# Patient Record
Sex: FEMALE | Race: WHITE | Employment: FULL TIME | URBAN - METROPOLITAN AREA
[De-identification: names, ages, dates, MRNs, and addresses within clinical notes are randomized per-mention and may not be internally consistent; named-entity substitution may affect disease eponyms.]

---

## 2017-09-28 ENCOUNTER — HOSPITAL ENCOUNTER (OUTPATIENT)
Dept: WOMENS IMAGING | Age: 47
Discharge: HOME | End: 2017-09-28
Attending: OBSTETRICS & GYNECOLOGY | Admitting: NURSE PRACTITIONER

## 2017-09-28 DIAGNOSIS — Z12.31 SCREENING MAMMOGRAM, ENCOUNTER FOR: ICD-10-CM

## 2020-11-25 ENCOUNTER — HOSPITAL ENCOUNTER (OUTPATIENT)
Dept: MAMMOGRAPHY | Age: 50
Discharge: HOME | End: 2020-11-25
Payer: COMMERCIAL

## 2020-11-25 PROCEDURE — 77067 SCR MAMMO BI INCL CAD: CPT

## 2024-04-03 ENCOUNTER — HOSPITAL ENCOUNTER (OUTPATIENT)
Dept: MAMMOGRAPHY | Age: 54
Discharge: HOME | End: 2024-04-03
Payer: COMMERCIAL

## 2024-04-03 VITALS — BODY MASS INDEX: 23.92 KG/M2 | HEIGHT: 62 IN | WEIGHT: 130 LBS

## 2024-04-03 DIAGNOSIS — Z12.31 SCREENING MAMMOGRAM FOR BREAST CANCER: ICD-10-CM

## 2024-04-03 PROCEDURE — 77063 BREAST TOMOSYNTHESIS BI: CPT

## 2025-02-04 ENCOUNTER — APPOINTMENT (OUTPATIENT)
Dept: GENERAL RADIOLOGY | Age: 55
End: 2025-02-04
Payer: COMMERCIAL

## 2025-02-04 ENCOUNTER — HOSPITAL ENCOUNTER (EMERGENCY)
Age: 55
Discharge: HOME | End: 2025-02-04
Attending: EMERGENCY MEDICINE
Payer: COMMERCIAL

## 2025-02-04 VITALS
HEART RATE: 54 BPM | RESPIRATION RATE: 16 BRPM | BODY MASS INDEX: 25.21 KG/M2 | DIASTOLIC BLOOD PRESSURE: 69 MMHG | TEMPERATURE: 97.6 F | WEIGHT: 137 LBS | SYSTOLIC BLOOD PRESSURE: 102 MMHG | HEIGHT: 62 IN | OXYGEN SATURATION: 97 %

## 2025-02-04 DIAGNOSIS — R07.89 CHEST WALL PAIN: Primary | ICD-10-CM

## 2025-02-04 LAB
ALBUMIN SERPL-MCNC: 4.2 G/DL (ref 3.4–5)
ALBUMIN/GLOB SERPL: 1.7 {RATIO} (ref 1.1–2.2)
ALP SERPL-CCNC: 62 U/L (ref 40–129)
ALT SERPL-CCNC: 16 U/L (ref 10–40)
ANION GAP SERPL CALCULATED.3IONS-SCNC: 10 MMOL/L (ref 4–16)
AST SERPL-CCNC: 26 U/L (ref 15–37)
BASOPHILS # BLD: 0.06 K/UL
BASOPHILS NFR BLD: 1 % (ref 0–1)
BILIRUB SERPL-MCNC: 0.3 MG/DL (ref 0–1)
BUN SERPL-MCNC: 20 MG/DL (ref 6–23)
CALCIUM SERPL-MCNC: 9.2 MG/DL (ref 8.3–10.6)
CHLORIDE SERPL-SCNC: 101 MMOL/L (ref 99–110)
CO2 SERPL-SCNC: 29 MMOL/L (ref 21–32)
CREAT SERPL-MCNC: 0.6 MG/DL (ref 0.6–1.1)
EOSINOPHIL # BLD: 0.11 K/UL
EOSINOPHILS RELATIVE PERCENT: 2 % (ref 0–3)
ERYTHROCYTE [DISTWIDTH] IN BLOOD BY AUTOMATED COUNT: 11 % (ref 11.7–14.9)
GFR, ESTIMATED: >90 ML/MIN/1.73M2
GLUCOSE SERPL-MCNC: 90 MG/DL (ref 74–99)
HCT VFR BLD AUTO: 42.7 % (ref 37–47)
HGB BLD-MCNC: 14.3 G/DL (ref 12.5–16)
IMM GRANULOCYTES # BLD AUTO: 0.01 K/UL
IMM GRANULOCYTES NFR BLD: 0 %
LYMPHOCYTES NFR BLD: 2.55 K/UL
LYMPHOCYTES RELATIVE PERCENT: 43 % (ref 24–44)
MCH RBC QN AUTO: 29.1 PG (ref 27–31)
MCHC RBC AUTO-ENTMCNC: 33.5 G/DL (ref 32–36)
MCV RBC AUTO: 87 FL (ref 78–100)
MONOCYTES NFR BLD: 0.74 K/UL
MONOCYTES NFR BLD: 13 % (ref 0–4)
NEUTROPHILS NFR BLD: 41 % (ref 36–66)
NEUTS SEG NFR BLD: 2.44 K/UL
PLATELET # BLD AUTO: 271 K/UL (ref 140–440)
PMV BLD AUTO: 8.4 FL (ref 7.5–11.1)
POTASSIUM SERPL-SCNC: 3.8 MMOL/L (ref 3.5–5.1)
PROT SERPL-MCNC: 6.7 G/DL (ref 6.4–8.2)
RBC # BLD AUTO: 4.91 M/UL (ref 4.2–5.4)
SODIUM SERPL-SCNC: 140 MMOL/L (ref 135–145)
TROPONIN I SERPL HS-MCNC: 6 NG/L (ref 0–13)
TROPONIN I SERPL HS-MCNC: 6 NG/L (ref 0–13)
WBC OTHER # BLD: 5.9 K/UL (ref 4–10.5)

## 2025-02-04 PROCEDURE — 80053 COMPREHEN METABOLIC PANEL: CPT

## 2025-02-04 PROCEDURE — 71045 X-RAY EXAM CHEST 1 VIEW: CPT

## 2025-02-04 PROCEDURE — 84484 ASSAY OF TROPONIN QUANT: CPT

## 2025-02-04 PROCEDURE — 99285 EMERGENCY DEPT VISIT HI MDM: CPT

## 2025-02-04 PROCEDURE — 93005 ELECTROCARDIOGRAM TRACING: CPT | Performed by: EMERGENCY MEDICINE

## 2025-02-04 PROCEDURE — 85025 COMPLETE CBC W/AUTO DIFF WBC: CPT

## 2025-02-04 ASSESSMENT — PAIN DESCRIPTION - PAIN TYPE: TYPE: ACUTE PAIN

## 2025-02-04 ASSESSMENT — PAIN SCALES - GENERAL: PAINLEVEL_OUTOF10: 3

## 2025-02-04 ASSESSMENT — PAIN DESCRIPTION - LOCATION: LOCATION: CHEST

## 2025-02-04 ASSESSMENT — PAIN DESCRIPTION - ONSET: ONSET: ON-GOING

## 2025-02-04 ASSESSMENT — PAIN DESCRIPTION - DESCRIPTORS: DESCRIPTORS: ACHING;DULL

## 2025-02-04 ASSESSMENT — PAIN DESCRIPTION - FREQUENCY: FREQUENCY: CONTINUOUS

## 2025-02-04 ASSESSMENT — LIFESTYLE VARIABLES
HOW MANY STANDARD DRINKS CONTAINING ALCOHOL DO YOU HAVE ON A TYPICAL DAY: PATIENT DOES NOT DRINK
HOW OFTEN DO YOU HAVE A DRINK CONTAINING ALCOHOL: NEVER

## 2025-02-04 ASSESSMENT — PAIN DESCRIPTION - ORIENTATION: ORIENTATION: MID;LEFT

## 2025-02-04 ASSESSMENT — PAIN - FUNCTIONAL ASSESSMENT
PAIN_FUNCTIONAL_ASSESSMENT: 0-10
PAIN_FUNCTIONAL_ASSESSMENT: ACTIVITIES ARE NOT PREVENTED

## 2025-02-05 NOTE — DISCHARGE INSTRUCTIONS
Your labs today including your cardiac enzymes, your EKG and your chest x-ray were overall nonacute.    Please help with your primary care physician as well as cardiology.    You may find an anti-inflammatory medications like ibuprofen, naproxen or Tylenol will help with your symptoms.    If you develop any worsening or concerning symptoms, please seek immediate medical evaluation.

## 2025-02-05 NOTE — ED PROVIDER NOTES
[facility] EMERGENCY DEPARTMENT  EMERGENCY DEPARTMENT ENCOUNTER      Pt Name: Mary WIN Friend  MRN: [id number]  Birthdate 1970  Date of evaluation: 2/4/2025  Provider: Cathy Moore MD    CHIEF COMPLAINT       Chief Complaint   Patient presents with    Chest Pain     Pt c/o Lt sided chest pain that started this morning. Pt also reports intermittent numbness to upper chest x1 week.          HISTORY OF PRESENT ILLNESS      Mary WIN Friend is a 54 y.o. female who presents to the emergency department  for   Chief Complaint   Patient presents with    Chest Pain     Pt c/o Lt sided chest pain that started this morning. Pt also reports intermittent numbness to upper chest x1 week.        54-year-old female presents for evaluation of chest pain.  She reports that she woke this morning with some midsternal chest pain.  Has been constant throughout the day.  No remarkable radiation of the pain.  She is endorse over the past week or so she has been having some intermittent numbness that runs down her left arm.  Denies any injury to her neck or chest wall.  She also endorses a sensation of some numbness across her anterior chest wall but that is been going on for 1 week.  She is not having established cardiac history.  Denies any remarkable first-degree family history of cardiac history.  Denies any tobacco use or vaping.  She is not on any routine medications.  Denies any established cardiac history.  Does not follow with cardiology.  She is not having abdominal pain.  No nausea or vomiting.  Denies any focal weakness.  No change in speech, hearing or vision.  Presents amatory without difficulty.  No signs of respiratory distress          Nursing Notes, Triage Notes & Vital Signs were reviewed.      REVIEW OF SYSTEMS    (2-9 systems for level 4, 10 or more for level 5)     Review of Systems   Musculoskeletal:         Chest wall pain       Except as noted above the remainder of the review of systems

## 2025-02-06 LAB
EKG ATRIAL RATE: 52 BPM
EKG DIAGNOSIS: NORMAL
EKG P AXIS: 73 DEGREES
EKG P-R INTERVAL: 222 MS
EKG Q-T INTERVAL: 436 MS
EKG QRS DURATION: 98 MS
EKG QTC CALCULATION (BAZETT): 405 MS
EKG R AXIS: 83 DEGREES
EKG T AXIS: 46 DEGREES
EKG VENTRICULAR RATE: 52 BPM

## 2025-02-06 PROCEDURE — 93010 ELECTROCARDIOGRAM REPORT: CPT | Performed by: INTERNAL MEDICINE

## 2025-06-04 ENCOUNTER — HOSPITAL ENCOUNTER (OUTPATIENT)
Dept: MAMMOGRAPHY | Age: 55
Discharge: HOME | End: 2025-06-04
Payer: COMMERCIAL

## 2025-06-04 DIAGNOSIS — Z12.31 ENCOUNTER FOR SCREENING MAMMOGRAM FOR MALIGNANT NEOPLASM OF BREAST: ICD-10-CM

## 2025-06-04 PROCEDURE — 77067 SCR MAMMO BI INCL CAD: CPT

## 2025-07-28 RX ORDER — MULTIVIT WITH MINERALS/LUTEIN
1000 TABLET ORAL DAILY
COMMUNITY
Start: 2019-09-19

## 2025-07-28 RX ORDER — FERROUS SULFATE 325(65) MG
325 TABLET ORAL
COMMUNITY
Start: 2019-09-19